# Patient Record
Sex: MALE | NOT HISPANIC OR LATINO | Employment: PART TIME | URBAN - METROPOLITAN AREA
[De-identification: names, ages, dates, MRNs, and addresses within clinical notes are randomized per-mention and may not be internally consistent; named-entity substitution may affect disease eponyms.]

---

## 2023-11-10 ENCOUNTER — LAB (OUTPATIENT)
Dept: LAB | Facility: LAB | Age: 38
End: 2023-11-10
Payer: MEDICAID

## 2023-11-10 DIAGNOSIS — I10 ESSENTIAL (PRIMARY) HYPERTENSION: Primary | ICD-10-CM

## 2023-11-10 DIAGNOSIS — E66.9 OBESITY, UNSPECIFIED: ICD-10-CM

## 2023-11-10 LAB
ALBUMIN SERPL-MCNC: 4.7 G/DL (ref 3.5–5)
ALP BLD-CCNC: 88 U/L (ref 35–125)
ALT SERPL-CCNC: 23 U/L (ref 5–40)
ANION GAP SERPL CALC-SCNC: 11 MMOL/L
AST SERPL-CCNC: 23 U/L (ref 5–40)
BASOPHILS # BLD AUTO: 0.03 X10*3/UL (ref 0–0.1)
BASOPHILS NFR BLD AUTO: 0.4 %
BILIRUB SERPL-MCNC: <0.2 MG/DL (ref 0.1–1.2)
BUN SERPL-MCNC: 16 MG/DL (ref 8–25)
CALCIUM SERPL-MCNC: 9.4 MG/DL (ref 8.5–10.4)
CHLORIDE SERPL-SCNC: 96 MMOL/L (ref 97–107)
CHOLEST SERPL-MCNC: 221 MG/DL (ref 133–200)
CHOLEST/HDLC SERPL: 3.9 {RATIO}
CO2 SERPL-SCNC: 28 MMOL/L (ref 24–31)
CREAT SERPL-MCNC: 1.2 MG/DL (ref 0.4–1.6)
EOSINOPHIL # BLD AUTO: 0.19 X10*3/UL (ref 0–0.7)
EOSINOPHIL NFR BLD AUTO: 2.3 %
ERYTHROCYTE [DISTWIDTH] IN BLOOD BY AUTOMATED COUNT: 13.2 % (ref 11.5–14.5)
GFR SERPL CREATININE-BSD FRML MDRD: 79 ML/MIN/1.73M*2
GLUCOSE SERPL-MCNC: 92 MG/DL (ref 65–99)
HCT VFR BLD AUTO: 44 % (ref 41–52)
HDLC SERPL-MCNC: 57 MG/DL
HGB BLD-MCNC: 14.5 G/DL (ref 13.5–17.5)
IMM GRANULOCYTES # BLD AUTO: 0.04 X10*3/UL (ref 0–0.7)
IMM GRANULOCYTES NFR BLD AUTO: 0.5 % (ref 0–0.9)
LDLC SERPL CALC-MCNC: 123 MG/DL (ref 65–130)
LYMPHOCYTES # BLD AUTO: 1.77 X10*3/UL (ref 1.2–4.8)
LYMPHOCYTES NFR BLD AUTO: 21.2 %
MCH RBC QN AUTO: 29.6 PG (ref 26–34)
MCHC RBC AUTO-ENTMCNC: 33 G/DL (ref 32–36)
MCV RBC AUTO: 90 FL (ref 80–100)
MONOCYTES # BLD AUTO: 0.58 X10*3/UL (ref 0.1–1)
MONOCYTES NFR BLD AUTO: 7 %
NEUTROPHILS # BLD AUTO: 5.73 X10*3/UL (ref 1.2–7.7)
NEUTROPHILS NFR BLD AUTO: 68.6 %
NRBC BLD-RTO: 0 /100 WBCS (ref 0–0)
PLATELET # BLD AUTO: 306 X10*3/UL (ref 150–450)
POTASSIUM SERPL-SCNC: 4.9 MMOL/L (ref 3.4–5.1)
PROT SERPL-MCNC: 7 G/DL (ref 5.9–7.9)
RBC # BLD AUTO: 4.9 X10*6/UL (ref 4.5–5.9)
SODIUM SERPL-SCNC: 135 MMOL/L (ref 133–145)
TRIGL SERPL-MCNC: 205 MG/DL (ref 40–150)
TSH SERPL DL<=0.05 MIU/L-ACNC: 4.96 MIU/L (ref 0.27–4.2)
WBC # BLD AUTO: 8.3 X10*3/UL (ref 4.4–11.3)

## 2023-11-10 PROCEDURE — 84443 ASSAY THYROID STIM HORMONE: CPT

## 2023-11-10 PROCEDURE — 85025 COMPLETE CBC W/AUTO DIFF WBC: CPT

## 2023-11-10 PROCEDURE — 80061 LIPID PANEL: CPT

## 2023-11-10 PROCEDURE — 36415 COLL VENOUS BLD VENIPUNCTURE: CPT

## 2023-11-10 PROCEDURE — 80053 COMPREHEN METABOLIC PANEL: CPT

## 2023-11-15 ENCOUNTER — OFFICE VISIT (OUTPATIENT)
Dept: PRIMARY CARE | Facility: CLINIC | Age: 38
End: 2023-11-15
Payer: MEDICAID

## 2023-11-15 VITALS
OXYGEN SATURATION: 97 % | HEART RATE: 75 BPM | TEMPERATURE: 96.6 F | HEIGHT: 70 IN | BODY MASS INDEX: 37.51 KG/M2 | DIASTOLIC BLOOD PRESSURE: 68 MMHG | WEIGHT: 262 LBS | SYSTOLIC BLOOD PRESSURE: 128 MMHG

## 2023-11-15 DIAGNOSIS — S29.011D STRAIN OF LEFT PECTORALIS MUSCLE, SUBSEQUENT ENCOUNTER: ICD-10-CM

## 2023-11-15 DIAGNOSIS — K64.8 INTERNAL HEMORRHOIDS: Primary | ICD-10-CM

## 2023-11-15 PROCEDURE — 99213 OFFICE O/P EST LOW 20 MIN: CPT

## 2023-11-15 PROCEDURE — 1036F TOBACCO NON-USER: CPT

## 2023-11-15 RX ORDER — OMEPRAZOLE 40 MG/1
1 CAPSULE, DELAYED RELEASE ORAL EVERY 24 HOURS
COMMUNITY
Start: 2017-09-13

## 2023-11-15 RX ORDER — LISINOPRIL 10 MG/1
10 TABLET ORAL DAILY
COMMUNITY

## 2023-11-15 ASSESSMENT — ENCOUNTER SYMPTOMS
DIARRHEA: 0
MYALGIAS: 1
WEAKNESS: 0
ANAL BLEEDING: 1
FATIGUE: 0
COUGH: 0
BRUISES/BLEEDS EASILY: 0
NAUSEA: 0
ABDOMINAL PAIN: 0
ADENOPATHY: 0
CONSTIPATION: 0
BACK PAIN: 0
CHEST TIGHTNESS: 0
DIZZINESS: 0
ARTHRALGIAS: 0
COLOR CHANGE: 0
LIGHT-HEADEDNESS: 0
APPETITE CHANGE: 0
HEADACHES: 0
BLOOD IN STOOL: 0
RECTAL PAIN: 0
ACTIVITY CHANGE: 0
JOINT SWELLING: 0
CHILLS: 0
FEVER: 0
PALPITATIONS: 0
VOMITING: 0
SHORTNESS OF BREATH: 0
ABDOMINAL DISTENTION: 0

## 2023-11-15 ASSESSMENT — PAIN SCALES - GENERAL: PAINLEVEL: 0-NO PAIN

## 2023-11-15 NOTE — PROGRESS NOTES
"Subjective   Patient ID: Jonatan Farias is a 37 y.o. male who presents for Muscle Pain (Left Chest muscle) and Rectal Bleeding (X 4-5 days).    HPI     Patient presents for complaints of left pectoral muscle pain and spasms which began about 3-4 weeks ago.  He states he tore his pectoral muscle in late 2019, he worked through the injury by working out at the gym.  He has not been doing any strength training over the last year and feels like the muscle pain and spasms have worsened.   He started a new job over the last month and the pain seems to have returned with the new job. He describes using his chest muscles move with his new job.    Jules also presents with complaints of rectal bleeding for several months.  He reports bright red blood per rectum, suspects he has a history of hemorrhoids. Admits the BRB is intermittent but concerning.  Denies any abdominal pain, rectal pain, changes in bowel habits or dark/tarry stools.  Denies any nausea, vomit, changes in appetite, or recent weight changes.     Review of Systems   Constitutional:  Negative for activity change, appetite change, chills, fatigue and fever.   Respiratory:  Negative for cough, chest tightness and shortness of breath.    Cardiovascular:  Negative for chest pain and palpitations.   Gastrointestinal:  Positive for anal bleeding. Negative for abdominal distention, abdominal pain, blood in stool, constipation, diarrhea, nausea, rectal pain and vomiting.   Musculoskeletal:  Positive for myalgias. Negative for arthralgias, back pain and joint swelling.   Skin:  Negative for color change, pallor and rash.   Neurological:  Negative for dizziness, syncope, weakness, light-headedness and headaches.   Hematological:  Negative for adenopathy. Does not bruise/bleed easily.           Objective   Blood Pressure 128/68 (BP Location: Right arm)   Pulse 75   Temperature 35.9 °C (96.6 °F) (Temporal)   Height 1.778 m (5' 10\")   Weight 119 kg (262 lb)   Oxygen " Saturation 97%   Body Mass Index 37.59 kg/m²     Physical Exam  Vitals and nursing note reviewed. Chaperone present: Declined chaperone.   Constitutional:       General: He is not in acute distress.     Appearance: Normal appearance. He is not ill-appearing.   Cardiovascular:      Rate and Rhythm: Normal rate and regular rhythm.      Pulses: Normal pulses.      Heart sounds: Normal heart sounds, S1 normal and S2 normal.   Pulmonary:      Effort: Pulmonary effort is normal.      Breath sounds: Normal breath sounds and air entry.   Abdominal:      General: Abdomen is protuberant. Bowel sounds are normal. There is no distension or abdominal bruit.      Palpations: Abdomen is soft. There is no hepatomegaly, splenomegaly, mass or pulsatile mass.      Tenderness: There is no abdominal tenderness.   Genitourinary:     Rectum: Internal hemorrhoid present. No mass, tenderness, anal fissure or external hemorrhoid. Normal anal tone.   Musculoskeletal:      Right shoulder: Normal.      Left shoulder: Normal.      Right upper arm: Normal.      Left upper arm: Normal.      Right elbow: Normal.      Left elbow: Normal.      Right forearm: Normal.      Left forearm: Normal.      Right wrist: Normal.      Left wrist: Normal.      Right hand: Normal.      Left hand: Normal.   Neurological:      Mental Status: He is alert.           Assessment/Plan   Problem List Items Addressed This Visit    None  Visit Diagnoses       Diagnosis Codes    Internal hemorrhoids    -  Primary K64.8    Relevant Orders    Referral to Gastroenterology  Acute problem.  No red flags noted.   Exam positive for internal hemorrhoids.  Will have patient follow up with GI for possible intervention options. May use OTC hemorrhoid preparations to help reduce size and irritation.  Discussed avoiding any anal sex.  Follow up if symptoms worsen or if unable to arrange an appointment with the gastroenterologist.     Strain of left pectoralis muscle, subsequent  encounter     S29.011D    Relevant Orders    Referral to Physical Therapy  Chronic condition.  Referred patient to physical therapy for evaluation and intervention to strengthen pectoral muscles. Follow up as needed.

## 2024-07-05 DIAGNOSIS — I10 PRIMARY HYPERTENSION: ICD-10-CM

## 2024-07-08 NOTE — TELEPHONE ENCOUNTER
Patient is calling regarding LISINOPRIL 10 MG, he stated he's been without medication for 3 days.    Pharmacy is [facility] in [place].    Patient can be reached at [phone]

## 2024-07-09 ENCOUNTER — TELEPHONE (OUTPATIENT)
Dept: PRIMARY CARE | Facility: CLINIC | Age: 39
End: 2024-07-09
Payer: MEDICAID

## 2024-07-09 DIAGNOSIS — I10 HYPERTENSION, UNSPECIFIED TYPE: ICD-10-CM

## 2024-07-09 DIAGNOSIS — Z13.220 LIPID SCREENING: ICD-10-CM

## 2024-07-09 DIAGNOSIS — Z00.00 WELL ADULT EXAM: ICD-10-CM

## 2024-07-09 RX ORDER — LISINOPRIL 10 MG/1
10 TABLET ORAL DAILY
Qty: 30 TABLET | Refills: 0 | Status: SHIPPED | OUTPATIENT
Start: 2024-07-09

## 2024-07-10 PROBLEM — A63.0 CONDYLOMA OF MALE GENITALIA: Status: ACTIVE | Noted: 2022-06-01

## 2024-07-10 PROBLEM — K21.9 GASTROESOPHAGEAL REFLUX DISEASE: Status: ACTIVE | Noted: 2022-02-25

## 2024-07-10 PROBLEM — F32.A DEPRESSION: Status: ACTIVE | Noted: 2023-03-01

## 2024-07-10 PROBLEM — G44.309 POST-TRAUMATIC HEADACHE, NOT INTRACTABLE: Status: ACTIVE | Noted: 2024-07-10

## 2024-07-10 PROBLEM — I10 HYPERTENSION: Status: ACTIVE | Noted: 2019-10-04

## 2024-08-01 ENCOUNTER — APPOINTMENT (OUTPATIENT)
Dept: PRIMARY CARE | Facility: CLINIC | Age: 39
End: 2024-08-01
Payer: MEDICAID

## 2024-08-08 DIAGNOSIS — I10 PRIMARY HYPERTENSION: ICD-10-CM

## 2024-08-08 RX ORDER — LISINOPRIL 10 MG/1
10 TABLET ORAL DAILY
Qty: 30 TABLET | Refills: 0 | Status: SHIPPED | OUTPATIENT
Start: 2024-08-08

## 2024-09-11 ENCOUNTER — TELEPHONE (OUTPATIENT)
Dept: PRIMARY CARE | Facility: CLINIC | Age: 39
End: 2024-09-11
Payer: MEDICAID

## 2024-09-11 DIAGNOSIS — E78.5 HYPERLIPIDEMIA, UNSPECIFIED HYPERLIPIDEMIA TYPE: ICD-10-CM

## 2024-09-11 DIAGNOSIS — I10 PRIMARY HYPERTENSION: ICD-10-CM

## 2024-09-11 DIAGNOSIS — E03.9 HYPOTHYROIDISM, UNSPECIFIED TYPE: Primary | ICD-10-CM

## 2024-09-11 RX ORDER — LISINOPRIL 10 MG/1
10 TABLET ORAL DAILY
Qty: 90 TABLET | Refills: 3 | Status: SHIPPED | OUTPATIENT
Start: 2024-09-11

## 2024-09-25 ENCOUNTER — APPOINTMENT (OUTPATIENT)
Dept: PRIMARY CARE | Facility: CLINIC | Age: 39
End: 2024-09-25
Payer: MEDICAID